# Patient Record
(demographics unavailable — no encounter records)

---

## 2025-07-25 NOTE — HISTORY OF PRESENT ILLNESS
YOU MUST CONFIRM YOUR APPOINTMENT 1 DAY PRIOR OR IT WILL BE CANCELLED!!   Our office will call you 3 times the day prior to your appointment in an attempt to confirm.  Please return our call ASAP or confirm your appt through [employer] no later than 3 pm the day before your appointment.  If we do not hear back from you by 3 pm to confirm, your appointment will be cancelled & someone will be added into that slot from our wait list.        [FreeTextEntry1] : Pt. presents for blood work for overall healthcare maintenance as prescribed by his physician. The patient denies fevers, chills, dizziness, fatigue, lightheadedness, loss of appetite.